# Patient Record
Sex: MALE | Race: AMERICAN INDIAN OR ALASKA NATIVE
[De-identification: names, ages, dates, MRNs, and addresses within clinical notes are randomized per-mention and may not be internally consistent; named-entity substitution may affect disease eponyms.]

---

## 2018-11-06 ENCOUNTER — HOSPITAL ENCOUNTER (INPATIENT)
Dept: HOSPITAL 31 - C.ER | Age: 73
LOS: 6 days | Discharge: HOME | DRG: 191 | End: 2018-11-12
Attending: INTERNAL MEDICINE | Admitting: INTERNAL MEDICINE
Payer: MEDICARE

## 2018-11-06 VITALS — BODY MASS INDEX: 26.4 KG/M2

## 2018-11-06 DIAGNOSIS — Z92.3: ICD-10-CM

## 2018-11-06 DIAGNOSIS — J44.1: Primary | ICD-10-CM

## 2018-11-06 DIAGNOSIS — I10: ICD-10-CM

## 2018-11-06 DIAGNOSIS — Z85.118: ICD-10-CM

## 2018-11-06 DIAGNOSIS — C79.31: ICD-10-CM

## 2018-11-06 DIAGNOSIS — Z87.891: ICD-10-CM

## 2018-11-06 DIAGNOSIS — N40.0: ICD-10-CM

## 2018-11-06 DIAGNOSIS — E11.9: ICD-10-CM

## 2018-11-06 DIAGNOSIS — E78.00: ICD-10-CM

## 2018-11-06 DIAGNOSIS — Z79.4: ICD-10-CM

## 2018-11-06 DIAGNOSIS — Z99.81: ICD-10-CM

## 2018-11-06 DIAGNOSIS — Z92.21: ICD-10-CM

## 2018-11-06 LAB
ALBUMIN SERPL-MCNC: 3.9 G/DL (ref 3.5–5)
ALBUMIN/GLOB SERPL: 1.4 {RATIO} (ref 1–2.1)
ALT SERPL-CCNC: 27 U/L (ref 21–72)
ANISOCYTOSIS BLD QL SMEAR: SLIGHT
AST SERPL-CCNC: 28 U/L (ref 17–59)
BASOPHILS # BLD AUTO: 0 K/UL (ref 0–0.2)
BASOPHILS NFR BLD: 0.3 % (ref 0–2)
BNP SERPL-MCNC: 183 PG/ML (ref 0–900)
BUN SERPL-MCNC: 19 MG/DL (ref 9–20)
BURR CELLS BLD QL SMEAR: SLIGHT
CALCIUM SERPL-MCNC: 9.4 MG/DL (ref 8.6–10.4)
CK MB SERPL-MCNC: 3.69 NG/ML (ref 0–3.38)
EOSINOPHIL # BLD AUTO: 0 K/UL (ref 0–0.7)
EOSINOPHIL NFR BLD: 0 % (ref 0–4)
ERYTHROCYTE [DISTWIDTH] IN BLOOD BY AUTOMATED COUNT: 16.9 % (ref 11.5–14.5)
GFR NON-AFRICAN AMERICAN: > 60
HGB BLD-MCNC: 12.3 G/DL (ref 12–18)
LYMPHOCYTE: 10 % (ref 20–40)
LYMPHOCYTES # BLD AUTO: 1.5 K/UL (ref 1–4.3)
LYMPHOCYTES NFR BLD AUTO: 9.5 % (ref 20–40)
MCH RBC QN AUTO: 28 PG (ref 27–31)
MCHC RBC AUTO-ENTMCNC: 33.3 G/DL (ref 33–37)
MCV RBC AUTO: 84.2 FL (ref 80–94)
MONOCYTE: 5 % (ref 0–10)
MONOCYTES # BLD: 0.7 K/UL (ref 0–0.8)
MONOCYTES NFR BLD: 4.4 % (ref 0–10)
NEUTROPHILS # BLD: 13.6 K/UL (ref 1.8–7)
NEUTROPHILS NFR BLD AUTO: 84 % (ref 50–75)
NEUTROPHILS NFR BLD AUTO: 85.8 % (ref 50–75)
NEUTS BAND NFR BLD: 1 % (ref 0–2)
NRBC BLD AUTO-RTO: 0 % (ref 0–2)
PLATELET # BLD EST: NORMAL 10*3/UL
PLATELET # BLD: 232 K/UL (ref 130–400)
PMV BLD AUTO: 8.7 FL (ref 7.2–11.7)
RBC # BLD AUTO: 4.39 MIL/UL (ref 4.4–5.9)
TOTAL CELLS COUNTED BLD: 100
WBC # BLD AUTO: 15.9 K/UL (ref 4.8–10.8)

## 2018-11-06 RX ADMIN — METHYLPREDNISOLONE SODIUM SUCCINATE SCH MG: 40 INJECTION, POWDER, FOR SOLUTION INTRAMUSCULAR; INTRAVENOUS at 18:25

## 2018-11-06 RX ADMIN — INSULIN ASPART SCH: 100 INJECTION, SOLUTION INTRAVENOUS; SUBCUTANEOUS at 21:16

## 2018-11-06 RX ADMIN — METHYLPREDNISOLONE SODIUM SUCCINATE SCH MG: 40 INJECTION, POWDER, FOR SOLUTION INTRAMUSCULAR; INTRAVENOUS at 23:58

## 2018-11-06 RX ADMIN — INSULIN GLARGINE SCH UNITS: 100 INJECTION, SOLUTION SUBCUTANEOUS at 22:01

## 2018-11-06 RX ADMIN — PROMETHAZINE HYDROCHLORIDE AND CODEINE PHOSPHATE PRN ML: 6.25; 1 SOLUTION ORAL at 18:25

## 2018-11-06 NOTE — RAD
Date of service: 



11/06/2018



PROCEDURE:  CHEST RADIOGRAPH, 1 VIEW



HISTORY:

SOB



COMPARISON:

3/24/2016



FINDINGS:



LUNGS:

Apical lordotic projection



Thin linear scarring-bibasilar right greater than left-appearance 

stable



No interval consolidation.  No mass appreciated



PLEURA:

No pneumothorax or pleural fluid seen.



CARDIOVASCULAR:

There is absence of aortic atherosclerotic calcification on x-ray..  

Normal heart size..  No pulmonary venous congestion



OSSEOUS STRUCTURES:

No fracture appreciated.  Bilateral shoulder arthrosis



VISUALIZED UPPER ABDOMEN:

Normal.



OTHER FINDINGS:

None. 



IMPRESSION:

No interval pathology noted.  No acute cardiopulmonary pathology 

noted

## 2018-11-06 NOTE — C.PDOC
History Of Present Illness





73-year-old male, PMHx includes Lung CA and COPD (on 3L O2 at home), is sent to 

the emergency department for evaluation of shortness of breath and wheezing for 

the past week, associated with productive cough. Patient was given antibiotics 

and Prednisone by Dr Anders with no improvement. He denies fever or chest pain. 

No other complaints at this time.


Time Seen by Provider: 11/06/18 09:44


Chief Complaint (Nursing): Shortness Of Breath


History Per: Patient


History/Exam Limitations: no limitations


Current Symptoms Are (Timing): Still Present





Past Medical History


Reviewed: Historical Data, Nursing Documentation, Vital Signs


Vital Signs: 





                                Last Vital Signs











Temp  97.7 F   11/06/18 09:56


 


Pulse  104 H  11/06/18 09:56


 


Resp  16   11/06/18 10:41


 


BP  100/67   11/06/18 09:56


 


Pulse Ox  97   11/06/18 10:15














- Medical History


PMH: Asthma, COPD, Diabetes, Emphysema, HTN, Hypercholesterolemia, Malignancy 

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


   Denies: Chronic Kidney Disease





- [facility] Procedures











EXCISION OF DESCENDING COLON, ENDO, DIAGN (03/24/16)


EXCISION OF STOMACH, ENDO, DIAGN (03/24/16)


INSERTION OF INFUSION DEV INTO R BASILIC VEIN, PERC APPROACH (02/19/16)


ULTRASONOGRAPHY OF RIGHT UPPER EXTREMITY VEINS, GUIDANCE (02/19/16)








Family History: States: Diabetes





- Social History


Hx Tobacco Use: No


Hx Alcohol Use: No


Hx Substance Use: No





- Immunization History


Hx Tetanus Toxoid Vaccination: No


Hx Influenza Vaccination: Yes


Hx Pneumococcal Vaccination: Yes





Review Of Systems


Constitutional: Negative for: Fever


Cardiovascular: Negative for: Chest Pain, Edema


Respiratory: Positive for: Cough, Sputum, Wheezing


Gastrointestinal: Negative for: Nausea, Vomiting





Physical Exam





- Physical Exam


Appears: Non-toxic, No Acute Distress


Skin: Warm, Dry, No Rash


Head: Atraumatic


Eye(s): bilateral: Normal Inspection


Nose: Normal


Oral Mucosa: Moist


Lips: Normal Appearing


Neck: Normal ROM


Chest: Symmetrical


Cardiovascular: Rhythm Regular, No Murmur


Respiratory: No Decreased Breath Sounds, No Accessory Muscle Use, No Rales, 

Wheezing (B/L expiratory)


Gastrointestinal/Abdominal: Soft, No Tenderness


Extremity: Normal ROM, No Pedal Edema


Neurological/Psych: Oriented x3, Normal Speech





ED Course And Treatment





- Laboratory Results


Result Diagrams: 


                                 11/06/18 10:28





                                 11/06/18 10:28


O2 Sat by Pulse Oximetry: 97


Pulse Ox Interpretation: Normal (RA)





- Other Rad


  ** CXR 


X-Ray: Viewed By Me, Read By Radiologist


Interpretation: Accession No. : [id number].  Patient Name / ID : FREEMAN PENN  / [id number].  Exam Date : 11/06/2018 10:37:04 ( Approved ).  Study 

Comment :  Sex / Age : M  / 73Y.  Creator : Dr. Caldwell-Lombardi, Kathleen V. 

Dictator : Dr. Caldwell-Lombardi, Kathleen V.   :  Approver : Dr. Caldwell-Lombardi, Kathleen V.  Approver2 :  Report Date : 11/06/2018 10:58:57. 

My Comment :  *******************

****************************************************************.  Date of 

service:  11/06/2018.  PROCEDURE:  CHEST RADIOGRAPH, 1 VIEW.  HISTORY:  SOB.  

COMPARISON:  3/24/2016.  FINDINGS:  LUNGS:  Apical lordotic projection.  Thin 

linear scarring-bibasilar right greater than left-appearance stable.  No 

interval consolidation.  No mass appreciated.  PLEURA:  No pneumothorax or 

pleural fluid seen.  CARDIOVASCULAR:  There is absence of aortic atherosclerotic

calcification on x-ray..  Normal heart size..  No pulmonary venous congestion.  

OSSEOUS STRUCTURES:  No fracture appreciated.  Bilateral shoulder arthrosis.  

VISUALIZED UPPER ABDOMEN:  Normal.  OTHER FINDINGS:  None.  IMPRESSION:  No 

interval pathology noted.  No acute cardiopulmonary pathology noted


Progress Note: EKG, Bloodwork, Chest X-Ray ordered and reviewed. Patient treated

with duoneb and Solumedrol.





Disposition





- Disposition


Forms:  [facility] (English)





- Scribe Statement


The provider has reviewed the documentation as recorded by the Scribe (Lebron Saunders)


Provider Attestation: 








All medical record entries made by the Scribe were at my direction and 

personally dictated by me. I have reviewed the chart and agree that the record 

accurately reflects my personal performance of the history, physical exam, 

medical decision making, and the department course for this patient. I have also

personally directed, reviewed, and agree with the discharge instructions and d

isposition.

## 2018-11-07 LAB
ALBUMIN SERPL-MCNC: 3.5 G/DL (ref 3.5–5)
ALBUMIN/GLOB SERPL: 1.4 {RATIO} (ref 1–2.1)
ALT SERPL-CCNC: 28 U/L (ref 21–72)
ANISOCYTOSIS BLD QL SMEAR: SLIGHT
AST SERPL-CCNC: 16 U/L (ref 17–59)
BASOPHILS # BLD AUTO: 0 K/UL (ref 0–0.2)
BASOPHILS NFR BLD: 0.1 % (ref 0–2)
BUN SERPL-MCNC: 20 MG/DL (ref 9–20)
CALCIUM SERPL-MCNC: 8.5 MG/DL (ref 8.6–10.4)
EOSINOPHIL # BLD AUTO: 0 K/UL (ref 0–0.7)
EOSINOPHIL NFR BLD: 0.1 % (ref 0–4)
ERYTHROCYTE [DISTWIDTH] IN BLOOD BY AUTOMATED COUNT: 16.9 % (ref 11.5–14.5)
GFR NON-AFRICAN AMERICAN: > 60
HGB BLD-MCNC: 11.1 G/DL (ref 12–18)
LYMPHOCYTE: 6 % (ref 20–40)
LYMPHOCYTES # BLD AUTO: 0.7 K/UL (ref 1–4.3)
LYMPHOCYTES NFR BLD AUTO: 4.5 % (ref 20–40)
MCH RBC QN AUTO: 28 PG (ref 27–31)
MCHC RBC AUTO-ENTMCNC: 33.3 G/DL (ref 33–37)
MCV RBC AUTO: 84.1 FL (ref 80–94)
MONOCYTE: 6 % (ref 0–10)
MONOCYTES # BLD: 0.5 K/UL (ref 0–0.8)
MONOCYTES NFR BLD: 3.3 % (ref 0–10)
NEUTROPHILS # BLD: 14.8 K/UL (ref 1.8–7)
NEUTROPHILS NFR BLD AUTO: 86 % (ref 50–75)
NEUTROPHILS NFR BLD AUTO: 92 % (ref 50–75)
NEUTS BAND NFR BLD: 2 % (ref 0–2)
NRBC BLD AUTO-RTO: 0 % (ref 0–2)
OVALOCYTES BLD QL SMEAR: SLIGHT
PLATELET # BLD EST: NORMAL 10*3/UL
PLATELET # BLD: 197 K/UL (ref 130–400)
PMV BLD AUTO: 8.2 FL (ref 7.2–11.7)
RBC # BLD AUTO: 3.94 MIL/UL (ref 4.4–5.9)
TOTAL CELLS COUNTED BLD: 100
WBC # BLD AUTO: 16.1 K/UL (ref 4.8–10.8)

## 2018-11-07 RX ADMIN — IPRATROPIUM BROMIDE AND ALBUTEROL SULFATE SCH ML: .5; 3 SOLUTION RESPIRATORY (INHALATION) at 08:24

## 2018-11-07 RX ADMIN — PROMETHAZINE HYDROCHLORIDE AND CODEINE PHOSPHATE PRN ML: 6.25; 1 SOLUTION ORAL at 21:40

## 2018-11-07 RX ADMIN — INSULIN GLARGINE SCH UNITS: 100 INJECTION, SOLUTION SUBCUTANEOUS at 21:40

## 2018-11-07 RX ADMIN — INSULIN ASPART SCH: 100 INJECTION, SOLUTION INTRAVENOUS; SUBCUTANEOUS at 21:25

## 2018-11-07 RX ADMIN — IPRATROPIUM BROMIDE AND ALBUTEROL SULFATE SCH ML: .5; 3 SOLUTION RESPIRATORY (INHALATION) at 19:57

## 2018-11-07 RX ADMIN — INSULIN ASPART SCH UNITS: 100 INJECTION, SOLUTION INTRAVENOUS; SUBCUTANEOUS at 10:38

## 2018-11-07 RX ADMIN — IPRATROPIUM BROMIDE AND ALBUTEROL SULFATE SCH ML: .5; 3 SOLUTION RESPIRATORY (INHALATION) at 12:50

## 2018-11-07 RX ADMIN — METHYLPREDNISOLONE SODIUM SUCCINATE SCH MG: 40 INJECTION, POWDER, FOR SOLUTION INTRAMUSCULAR; INTRAVENOUS at 17:33

## 2018-11-07 RX ADMIN — PROMETHAZINE HYDROCHLORIDE AND CODEINE PHOSPHATE PRN ML: 6.25; 1 SOLUTION ORAL at 10:46

## 2018-11-07 RX ADMIN — METHYLPREDNISOLONE SODIUM SUCCINATE SCH MG: 40 INJECTION, POWDER, FOR SOLUTION INTRAMUSCULAR; INTRAVENOUS at 12:33

## 2018-11-07 RX ADMIN — IPRATROPIUM BROMIDE AND ALBUTEROL SULFATE SCH ML: .5; 3 SOLUTION RESPIRATORY (INHALATION) at 03:29

## 2018-11-07 RX ADMIN — PANTOPRAZOLE SODIUM SCH MG: 40 TABLET, DELAYED RELEASE ORAL at 10:42

## 2018-11-07 RX ADMIN — INSULIN ASPART SCH UNITS: 100 INJECTION, SOLUTION INTRAVENOUS; SUBCUTANEOUS at 17:51

## 2018-11-07 RX ADMIN — METHYLPREDNISOLONE SODIUM SUCCINATE SCH MG: 40 INJECTION, POWDER, FOR SOLUTION INTRAMUSCULAR; INTRAVENOUS at 05:14

## 2018-11-07 RX ADMIN — IPRATROPIUM BROMIDE AND ALBUTEROL SULFATE SCH ML: .5; 3 SOLUTION RESPIRATORY (INHALATION) at 15:46

## 2018-11-07 RX ADMIN — IPRATROPIUM BROMIDE AND ALBUTEROL SULFATE SCH ML: .5; 3 SOLUTION RESPIRATORY (INHALATION) at 00:03

## 2018-11-07 RX ADMIN — ENOXAPARIN SODIUM SCH MG: 40 INJECTION SUBCUTANEOUS at 10:40

## 2018-11-07 RX ADMIN — INSULIN ASPART SCH UNITS: 100 INJECTION, SOLUTION INTRAVENOUS; SUBCUTANEOUS at 12:46

## 2018-11-07 NOTE — CARD
--------------- APPROVED REPORT --------------





Date of service: 11/06/2018



EKG Measurement

Heart [id number]

RI [id number]

[id number]

[id number]

[id number]



<Conclusion>

Sinus tachycardia

Otherwise normal ECG

## 2018-11-07 NOTE — CP.PCM.PN
Subjective





- Date & Time of Evaluation


Date of Evaluation: 11/07/18


Time of Evaluation: 14:25





- Subjective


Subjective: 

















This is a 72 yo male with past medical hx of small cell lung cancer, s/p chemo 

and radiation, COPD, on home oxygen, HTN, DM, BPH, presenting to [facility] [place] ER after

being sent in from Dr. Anders's office. Pt has been having an increase in 

shortness of breath and wheezing for some days now. Pt was going in and being 

treated by Dr. Anders. Tx was not helping so pt went back into office, but Dr. Anders recommended he be admitted for hospitalization. Most recent echo shows EF

of 60-65 percent with normal EF. 














PMH: COPD, small cell lung cancer, s/p chemo and radiation (seen by Dr. Brandon)











PSH: 











Allergies: NKDA











FH: Non contributory











Social hx: former smoker. denies drinking. denies drug use. Born in . 





Objective





- Vital Signs/Intake and Output


Vital Signs (last 24 hours): 


                                        











Temp Pulse Resp BP Pulse Ox


 


 97.9 F   86   20   136/77   99 


 


 11/07/18 08:00  11/07/18 08:00  11/07/18 08:00  11/07/18 10:42  11/07/18 08:00








Intake and Output: 


                                        











 11/07/18 11/07/18





 06:59 18:59


 


Intake Total 740 


 


Balance 740 














- Medications


Medications: 


                               Current Medications





Albuterol/Ipratropium (Duoneb 3 Mg/0.5 Mg (3 Ml) Ud)  3 ml INH RQ4 [facility]


   Last Admin: 11/07/18 12:50 Dose:  3 ml


Aspirin (Aspirin Chewable)  81 mg PO DAILY [facility]


   Last Admin: 11/07/18 10:43 Dose:  81 mg


Enalapril Maleate (Vasotec)  10 mg PO BID [facility]


   Last Admin: 11/07/18 10:42 Dose:  10 mg


Enoxaparin Sodium (Lovenox)  40 mg SC DAILY [facility]


   Last Admin: 11/07/18 10:40 Dose:  40 mg


Home Med (Patient's Own Drops)  1 drop OS QID [facility]


   Last Admin: 11/07/18 13:26 Dose:  1 drop


Home Med (Patient's Own Drops)  1 drop OD QID [facility]


   Last Admin: 11/07/18 13:30 Dose:  1 drop


Home Med (Patient's Own Drops)  1 drop OS [facility]


   Last Admin: 11/06/18 21:21 Dose:  Not Given


Insulin Aspart (Novolog)  0 unit SC [facility]; Protocol


   Last Admin: 11/07/18 12:46 Dose:  4 units


Insulin Glargine (Lantus)  10 unit SC [facility]


   Last Admin: 11/06/18 22:01 Dose:  10 units


Metformin HCl (Glucophage)  500 mg PO BID [facility]


   Last Admin: 11/07/18 10:43 Dose:  500 mg


Methylprednisolone (Solu-Medrol)  40 mg IVP Q6H [facility]


   Last Admin: 11/07/18 12:33 Dose:  40 mg


Moxifloxacin HCl (Avelox)  400 mg PO DAILY [facility]; Protocol


   Last Admin: 11/07/18 10:41 Dose:  400 mg


Pantoprazole Sodium (Protonix Ec Tab)  40 mg PO DAILY [facility]


   Last Admin: 11/07/18 10:42 Dose:  40 mg


Promethazine HCl/Codeine (Phenergan/Codeine Oral Syrup)  5 ml PO Q6H PRN


   PRN Reason: Cough


   Last Admin: 11/07/18 10:46 Dose:  5 ml


Rosuvastatin Calcium (Crestor)  5 mg PO [facility]


   Last Admin: 11/06/18 21:20 Dose:  5 mg


Tamsulosin HCl (Flomax)  0.4 mg PO DAILY [facility]


   Last Admin: 11/07/18 10:45 Dose:  0.4 mg











- Labs


Labs: 


                                        





                                 11/07/18 10:57 





                                 11/07/18 10:57 











- Constitutional


Appears: Non-toxic, No Acute Distress





- Head Exam


Head Exam: ATRAUMATIC, NORMAL INSPECTION, NORMOCEPHALIC





- Eye Exam


Eye Exam: EOMI





- ENT Exam


ENT Exam: Mucous Membranes Moist





- Respiratory Exam


Respiratory Exam: Wheezes.  absent: Respiratory Distress





- Cardiovascular Exam


Cardiovascular Exam: +S1, +S2





- GI/Abdominal Exam


GI & Abdominal Exam: Soft, Normal Bowel Sounds.  absent: Tenderness





- Extremities Exam


Extremities Exam: Full ROM, Normal Inspection





- Back Exam


Back Exam: NORMAL INSPECTION





- Neurological Exam


Neurological Exam: Alert, Awake, Oriented x3





- Psychiatric Exam


Psychiatric exam: Normal Affect, Normal Mood





- Skin


Skin Exam: Dry, Intact, Normal Color, Warm





Assessment and Plan





- Assessment and Plan (Free Text)


Assessment: 




















This is a 72 yo male with














1. COPD exacerbation








-trop negative x 1


-duonebs RQ4 


-solumedrol 40 IV q 6


-oxygen therapy 


-avelox 400 PO daily














2. Hx of HTN











-continue enalapril 10 mg PO BID














3. hx of lung cancer








-small cell


-no hx of sx


-s/p chemo and radiation


-tx with  16 and cisplatin

















4. hx of DM





-a1C


-lipid panel


-ISS


-lantus 10 


-metformin 500 bid




















5. hx of BPH











-flomax .4 daily

















6. GI/DVT











-lovenox 40 


-protonix 40 daily























discussed with Dr. Anders.

## 2018-11-08 RX ADMIN — PROMETHAZINE HYDROCHLORIDE AND CODEINE PHOSPHATE PRN ML: 6.25; 1 SOLUTION ORAL at 09:29

## 2018-11-08 RX ADMIN — METHYLPREDNISOLONE SODIUM SUCCINATE SCH MG: 40 INJECTION, POWDER, FOR SOLUTION INTRAMUSCULAR; INTRAVENOUS at 12:44

## 2018-11-08 RX ADMIN — IPRATROPIUM BROMIDE AND ALBUTEROL SULFATE SCH: .5; 3 SOLUTION RESPIRATORY (INHALATION) at 04:50

## 2018-11-08 RX ADMIN — INSULIN GLARGINE SCH UNITS: 100 INJECTION, SOLUTION SUBCUTANEOUS at 22:29

## 2018-11-08 RX ADMIN — IPRATROPIUM BROMIDE AND ALBUTEROL SULFATE SCH ML: .5; 3 SOLUTION RESPIRATORY (INHALATION) at 00:55

## 2018-11-08 RX ADMIN — INSULIN ASPART SCH UNITS: 100 INJECTION, SOLUTION INTRAVENOUS; SUBCUTANEOUS at 12:30

## 2018-11-08 RX ADMIN — PANTOPRAZOLE SODIUM SCH MG: 40 TABLET, DELAYED RELEASE ORAL at 09:11

## 2018-11-08 RX ADMIN — ACETYLCYSTEINE SCH: 200 INHALANT RESPIRATORY (INHALATION) at 11:15

## 2018-11-08 RX ADMIN — IPRATROPIUM BROMIDE AND ALBUTEROL SULFATE SCH ML: .5; 3 SOLUTION RESPIRATORY (INHALATION) at 15:45

## 2018-11-08 RX ADMIN — ACETYLCYSTEINE SCH ML: 200 INHALANT RESPIRATORY (INHALATION) at 15:45

## 2018-11-08 RX ADMIN — ACETYLCYSTEINE SCH ML: 200 INHALANT RESPIRATORY (INHALATION) at 20:27

## 2018-11-08 RX ADMIN — IPRATROPIUM BROMIDE AND ALBUTEROL SULFATE SCH ML: .5; 3 SOLUTION RESPIRATORY (INHALATION) at 08:15

## 2018-11-08 RX ADMIN — INSULIN ASPART SCH UNITS: 100 INJECTION, SOLUTION INTRAVENOUS; SUBCUTANEOUS at 08:30

## 2018-11-08 RX ADMIN — INSULIN ASPART SCH: 100 INJECTION, SOLUTION INTRAVENOUS; SUBCUTANEOUS at 21:03

## 2018-11-08 RX ADMIN — ACETYLCYSTEINE SCH ML: 200 INHALANT RESPIRATORY (INHALATION) at 12:30

## 2018-11-08 RX ADMIN — METHYLPREDNISOLONE SODIUM SUCCINATE SCH MG: 40 INJECTION, POWDER, FOR SOLUTION INTRAMUSCULAR; INTRAVENOUS at 05:23

## 2018-11-08 RX ADMIN — ENOXAPARIN SODIUM SCH MG: 40 INJECTION SUBCUTANEOUS at 09:14

## 2018-11-08 RX ADMIN — METHYLPREDNISOLONE SODIUM SUCCINATE SCH MG: 40 INJECTION, POWDER, FOR SOLUTION INTRAMUSCULAR; INTRAVENOUS at 22:29

## 2018-11-08 RX ADMIN — METHYLPREDNISOLONE SODIUM SUCCINATE SCH MG: 40 INJECTION, POWDER, FOR SOLUTION INTRAMUSCULAR; INTRAVENOUS at 00:19

## 2018-11-08 RX ADMIN — METHYLPREDNISOLONE SODIUM SUCCINATE SCH MG: 40 INJECTION, POWDER, FOR SOLUTION INTRAMUSCULAR; INTRAVENOUS at 15:35

## 2018-11-08 RX ADMIN — INSULIN ASPART SCH UNITS: 100 INJECTION, SOLUTION INTRAVENOUS; SUBCUTANEOUS at 17:53

## 2018-11-08 RX ADMIN — IPRATROPIUM BROMIDE AND ALBUTEROL SULFATE SCH ML: .5; 3 SOLUTION RESPIRATORY (INHALATION) at 20:27

## 2018-11-08 RX ADMIN — IPRATROPIUM BROMIDE AND ALBUTEROL SULFATE SCH ML: .5; 3 SOLUTION RESPIRATORY (INHALATION) at 12:30

## 2018-11-08 NOTE — CP.PCM.PN
Subjective





- Date & Time of Evaluation


Date of Evaluation: 11/08/18


Time of Evaluation: 07:09





- Subjective


Subjective: 


PGY2- Progress note for Dr. Anders





Patient was seen and examined at bedside in no acute distress. Patient reports 

feeling better than yesterday. Patient denies cough, shortness or breath, chest 

pain, palpitations, nausea, vomiting, fevers, headaches, abdominal pain, 

constipation, diarrhea, dysuria. No acute events overnight.





Objective





- Vital Signs/Intake and Output


Vital Signs (last 24 hours): 


                                        











Temp Pulse Resp BP Pulse Ox


 


 98 F   100 H  20   121/81   98 


 


 11/07/18 23:35  11/08/18 00:15  11/07/18 23:35  11/07/18 23:35  11/07/18 23:35








Intake and Output: 


                                        











 11/08/18 11/08/18





 06:59 18:59


 


Intake Total 1000 


 


Balance 1000 














- Medications


Medications: 


                               Current Medications





Albuterol/Ipratropium (Duoneb 3 Mg/0.5 Mg (3 Ml) Ud)  3 ml INH RQ4 [facility]


   Last Admin: 11/08/18 04:50 Dose:  Not Given


Aspirin (Aspirin Chewable)  81 mg PO DAILY [facility]


   Last Admin: 11/07/18 10:43 Dose:  81 mg


Enalapril Maleate (Vasotec)  10 mg PO BID [facility]


   Last Admin: 11/07/18 17:52 Dose:  10 mg


Enoxaparin Sodium (Lovenox)  40 mg SC DAILY [facility]


   Last Admin: 11/07/18 10:40 Dose:  40 mg


Home Med (Patient's Own Drops)  1 drop OS QID [facility]


   Last Admin: 11/07/18 21:42 Dose:  1 drop


Home Med (Patient's Own Drops)  1 drop OD QID [facility]


   Last Admin: 11/07/18 21:42 Dose:  1 drop


Home Med (Patient's Own Drops)  1 drop OS [facility]


   Last Admin: 11/07/18 21:41 Dose:  1 drop


Insulin Aspart (Novolog)  0 unit SC [facility]; Protocol


   Last Admin: 11/07/18 21:25 Dose:  Not Given


Insulin Glargine (Lantus)  10 unit SC [facility]


   Last Admin: 11/07/18 21:40 Dose:  10 units


Metformin HCl (Glucophage)  500 mg PO BID [facility]


   Last Admin: 11/07/18 17:33 Dose:  500 mg


Methylprednisolone (Solu-Medrol)  40 mg IVP Q6H [facility]


   Last Admin: 11/08/18 05:23 Dose:  40 mg


Moxifloxacin HCl (Avelox)  400 mg PO DAILY [facility]; Protocol


   Last Admin: 11/07/18 10:41 Dose:  400 mg


Pantoprazole Sodium (Protonix Ec Tab)  40 mg PO DAILY [facility]


   Last Admin: 11/07/18 10:42 Dose:  40 mg


Promethazine HCl/Codeine (Phenergan/Codeine Oral Syrup)  5 ml PO Q6H PRN


   PRN Reason: Cough


   Last Admin: 11/07/18 21:40 Dose:  5 ml


Rosuvastatin Calcium (Crestor)  5 mg PO HS [facility]


   Last Admin: 11/07/18 21:40 Dose:  5 mg


Tamsulosin HCl (Flomax)  0.4 mg PO DAILY [facility]


   Last Admin: 11/07/18 10:45 Dose:  0.4 mg











- Labs


Labs: 


                                        





                                 11/07/18 10:57 





                                 11/07/18 10:57 











- Constitutional


Appears: No Acute Distress





- Head Exam


Head Exam: NORMAL INSPECTION





- Eye Exam


Eye Exam: EOMI, Normal appearance





- ENT Exam


ENT Exam: Mucous Membranes Moist





- Respiratory Exam


Respiratory Exam: Wheezes, NORMAL BREATHING PATTERN.  absent: Rales, Rhonchi, 

Respiratory Distress





- Cardiovascular Exam


Cardiovascular Exam: REGULAR RHYTHM, +S1, +S2





- GI/Abdominal Exam


GI & Abdominal Exam: Soft, Normal Bowel Sounds.  absent: Distended, Firm, 

Tenderness





- Extremities Exam


Extremities Exam: Normal Inspection.  absent: Calf Tenderness, Pedal Edema, 

Tenderness





- Neurological Exam


Neurological Exam: Alert, Awake, Oriented x3





- Psychiatric Exam


Psychiatric exam: Normal Affect, Normal Mood





- Skin


Skin Exam: Dry, Normal Color, Warm





Assessment and Plan





- Assessment and Plan (Free Text)


Plan: 


COPD exacerbation


- Duonebs RQ4, mucomyst Q4h


- Solumedrol 40 IV q8h


- O2 via nc prn 


- Avelox 400 PO daily (active on 11/6/18)





DM


- Accuchecks


- Hypoglycemia protocol


- a1C: f/u


- ISS


- Lantus 10units SC HS


- Metformin 500mg PO BID





HTN


- Continue Enalapril 10 mg PO BID





HLD


- Continue Crestor 5mg PO HS





Hx of small cell lung cancer


- s/p chemo and radiation; tx with  16 and cisplatin





BPH


- Flomax 0.4mg PO daily





Prophylaxis


- DVT: Lovenox 40mg SC, SCDs


- Protonix 40mg PO daily


- Consistent carb diet





All management/orders per Dr. Anders.

## 2018-11-09 RX ADMIN — INSULIN GLARGINE SCH UNITS: 100 INJECTION, SOLUTION SUBCUTANEOUS at 22:30

## 2018-11-09 RX ADMIN — IPRATROPIUM BROMIDE AND ALBUTEROL SULFATE SCH ML: .5; 3 SOLUTION RESPIRATORY (INHALATION) at 08:15

## 2018-11-09 RX ADMIN — PROMETHAZINE HYDROCHLORIDE AND CODEINE PHOSPHATE PRN ML: 6.25; 1 SOLUTION ORAL at 19:02

## 2018-11-09 RX ADMIN — ACETYLCYSTEINE SCH: 200 INHALANT RESPIRATORY (INHALATION) at 19:31

## 2018-11-09 RX ADMIN — METHYLPREDNISOLONE SODIUM SUCCINATE SCH MG: 40 INJECTION, POWDER, FOR SOLUTION INTRAMUSCULAR; INTRAVENOUS at 06:35

## 2018-11-09 RX ADMIN — PANTOPRAZOLE SODIUM SCH MG: 40 TABLET, DELAYED RELEASE ORAL at 10:32

## 2018-11-09 RX ADMIN — ACETYLCYSTEINE SCH: 200 INHALANT RESPIRATORY (INHALATION) at 13:58

## 2018-11-09 RX ADMIN — ACETYLCYSTEINE SCH: 200 INHALANT RESPIRATORY (INHALATION) at 16:15

## 2018-11-09 RX ADMIN — IPRATROPIUM BROMIDE AND ALBUTEROL SULFATE SCH ML: .5; 3 SOLUTION RESPIRATORY (INHALATION) at 00:19

## 2018-11-09 RX ADMIN — ACETYLCYSTEINE SCH ML: 200 INHALANT RESPIRATORY (INHALATION) at 08:16

## 2018-11-09 RX ADMIN — IPRATROPIUM BROMIDE AND ALBUTEROL SULFATE SCH ML: .5; 3 SOLUTION RESPIRATORY (INHALATION) at 03:16

## 2018-11-09 RX ADMIN — IPRATROPIUM BROMIDE AND ALBUTEROL SULFATE SCH ML: .5; 3 SOLUTION RESPIRATORY (INHALATION) at 13:59

## 2018-11-09 RX ADMIN — METHYLPREDNISOLONE SODIUM SUCCINATE SCH MG: 40 INJECTION, POWDER, FOR SOLUTION INTRAMUSCULAR; INTRAVENOUS at 22:29

## 2018-11-09 RX ADMIN — INSULIN ASPART SCH UNITS: 100 INJECTION, SOLUTION INTRAVENOUS; SUBCUTANEOUS at 12:48

## 2018-11-09 RX ADMIN — ACETYLCYSTEINE SCH: 200 INHALANT RESPIRATORY (INHALATION) at 00:19

## 2018-11-09 RX ADMIN — IPRATROPIUM BROMIDE AND ALBUTEROL SULFATE SCH ML: .5; 3 SOLUTION RESPIRATORY (INHALATION) at 19:32

## 2018-11-09 RX ADMIN — INSULIN ASPART SCH UNITS: 100 INJECTION, SOLUTION INTRAVENOUS; SUBCUTANEOUS at 17:25

## 2018-11-09 RX ADMIN — METHYLPREDNISOLONE SODIUM SUCCINATE SCH MG: 40 INJECTION, POWDER, FOR SOLUTION INTRAMUSCULAR; INTRAVENOUS at 14:38

## 2018-11-09 RX ADMIN — IPRATROPIUM BROMIDE AND ALBUTEROL SULFATE SCH ML: .5; 3 SOLUTION RESPIRATORY (INHALATION) at 16:15

## 2018-11-09 RX ADMIN — ENOXAPARIN SODIUM SCH MG: 40 INJECTION SUBCUTANEOUS at 10:30

## 2018-11-09 RX ADMIN — INSULIN ASPART SCH UNITS: 100 INJECTION, SOLUTION INTRAVENOUS; SUBCUTANEOUS at 10:30

## 2018-11-09 RX ADMIN — INSULIN ASPART SCH: 100 INJECTION, SOLUTION INTRAVENOUS; SUBCUTANEOUS at 21:47

## 2018-11-09 RX ADMIN — ACETYLCYSTEINE SCH: 200 INHALANT RESPIRATORY (INHALATION) at 03:16

## 2018-11-09 NOTE — CP.PCM.PN
Subjective





- Date & Time of Evaluation


Date of Evaluation: 11/09/18


Time of Evaluation: 07:29





- Subjective


Subjective: 


PGY2- Progress note for Dr. Anders





Patient was seen and examined at bedside in no acute distress. Patient admits to

wheezing and still feeling short of breath. He denies having cough, fevers, 

headaches, nausea, vomiting, abdominal pain, chest pain, palpitations.





Objective





- Vital Signs/Intake and Output


Vital Signs (last 24 hours): 


                                        











Temp Pulse Resp BP Pulse Ox


 


 98.2 F   101 H  20   110/64   98 


 


 11/08/18 23:33  11/09/18 00:05  11/08/18 23:33  11/08/18 23:33  11/08/18 23:33








Intake and Output: 


                                        











 11/09/18 11/09/18





 06:59 18:59


 


Intake Total 240 


 


Balance 240 














- Medications


Medications: 


                               Current Medications





Acetylcysteine (Acetylcysteine 20%)  4 ml INH RQ4 [facility]


   Last Admin: 11/09/18 03:16 Dose:  Not Given


Albuterol/Ipratropium (Duoneb 3 Mg/0.5 Mg (3 Ml) Ud)  3 ml INH RQ4 [facility]


   Last Admin: 11/09/18 03:16 Dose:  3 ml


Aspirin (Aspirin Chewable)  81 mg PO DAILY [facility]


   Last Admin: 11/08/18 09:11 Dose:  81 mg


Enalapril Maleate (Vasotec)  10 mg PO BID [facility]


   Last Admin: 11/08/18 17:00 Dose:  Not Given


Enoxaparin Sodium (Lovenox)  40 mg SC DAILY [facility]


   Last Admin: 11/08/18 09:14 Dose:  40 mg


Home Med (Patient's Own Drops)  1 drop OS QID [facility]


   Last Admin: 11/08/18 22:29 Dose:  1 drop


Home Med (Patient's Own Drops)  1 drop OD QID [facility]


   Last Admin: 11/08/18 22:29 Dose:  1 drop


Home Med (Patient's Own Drops)  1 drop OS HS [facility]


   Last Admin: 11/08/18 22:29 Dose:  1 drop


Insulin Aspart (Novolog)  0 unit SC [facility]; Protocol


   Last Admin: 11/08/18 21:03 Dose:  Not Given


Insulin Glargine (Lantus)  10 unit SC [facility]


   Last Admin: 11/08/18 22:29 Dose:  10 units


Metformin HCl (Glucophage)  500 mg PO BID [facility]


   Last Admin: 11/08/18 17:53 Dose:  500 mg


Methylprednisolone (Solu-Medrol)  40 mg IVP Q8H [facility]


   Last Admin: 11/09/18 06:35 Dose:  40 mg


Moxifloxacin HCl (Avelox)  400 mg PO DAILY [facility]; Protocol


   Last Admin: 11/08/18 09:12 Dose:  400 mg


Pantoprazole Sodium (Protonix Ec Tab)  40 mg PO DAILY [facility]


   Last Admin: 11/08/18 09:11 Dose:  40 mg


Promethazine HCl/Codeine (Phenergan/Codeine Oral Syrup)  5 ml PO Q6H PRN


   PRN Reason: Cough


   Last Admin: 11/08/18 09:29 Dose:  5 ml


Rosuvastatin Calcium (Crestor)  5 mg PO HS [facility]


   Last Admin: 11/08/18 22:29 Dose:  5 mg


Tamsulosin HCl (Flomax)  0.4 mg PO DAILY [facility]


   Last Admin: 11/08/18 09:12 Dose:  0.4 mg











- Labs


Labs: 


                                        





                                 11/07/18 10:57 





                                 11/07/18 10:57 











- Additional Findings


Additional findings: 


- Constitutional


Appears: No Acute Distress





- Head Exam


Head Exam: NORMAL INSPECTION





- Eye Exam


Eye Exam: EOMI, Normal appearance





- ENT Exam


ENT Exam: Mucous Membranes Moist





- Respiratory Exam


Respiratory Exam: Wheezes B/L, NORMAL BREATHING PATTERN.  absent: Rales, 

Rhonchi, Respiratory Distress





- Cardiovascular Exam


Cardiovascular Exam: REGULAR RHYTHM, +S1, +S2





- GI/Abdominal Exam


GI & Abdominal Exam: Soft, Normal Bowel Sounds.  absent: Distended, Firm, 

Tenderness





- Extremities Exam


Extremities Exam: Normal Inspection.  absent: Calf Tenderness, Pedal Edema, 

Tenderness





- Neurological Exam


Neurological Exam: Alert, Awake, Oriented x3





- Psychiatric Exam


Psychiatric exam: Normal Affect, Normal Mood





- Skin


Skin Exam: Dry, Normal Color, Warm





Assessment and Plan





- Assessment and Plan (Free Text)


Plan: 


COPD exacerbation


- Duonebs RQ4, mucomyst Q4h


- Solumedrol 40 IV q8h


- O2 via nc prn 


- Avelox 400 PO daily (active on 11/6/18)


- CXR: negative for acute pathology


- Chest CT: chronic changes consistent with COPD





DM


- Accuchecks


- Hypoglycemia protocol


- ISS


- Lantus 10units SC HS


- Metformin 500mg PO BID





HTN


- Continue Enalapril 10 mg PO BID





HLD


- Continue Crestor 5mg PO HS





Hx of small cell lung cancer


- s/p chemo and radiation; tx with  16 and cisplatin





BPH


- Flomax 0.4mg PO daily





Prophylaxis


- DVT: Lovenox 40mg SC, SCDs


- Protonix 40mg PO daily


- Consistent carb diet





All management/orders per Dr. Anders.

## 2018-11-09 NOTE — CT
Date of service: 



11/09/2018



PROCEDURE:  CT Chest without contrast



HISTORY:

Shortness of breath, wheezing



COMPARISON:

None available.



TECHNIQUE:

Contiguous axial images were obtained through the chest without 

intravenous contrast enhancement. Sagittal and coronal 

reconstructions were performed.







Radiation dose:



Total exam DLP = 656.74 mGy-cm.



This CT exam was performed using one or more of the following dose 

reduction techniques: Automated exposure control, adjustment of the 

mA and/or kV according to patient size, and/or use of iterative 

reconstruction technique.



FINDINGS:



LUNGS:

Chronic manifestations of COPD felt be present.. Mild 

curvilinear/linear type atelectasis/scarring changes seen in the 

right lower lobe as well as right middle lobe bordering the minor 

fissure. 



MEDIASTINUM:

Unremarkable thoracic aorta. No aneurysm.. At ascending thoracic 

aorta measures approximately 3.6 cm and descending thoracic aorta 

measures approximately 2.8 cm. Normal sized heart. Main pulmonary 

artery unremarkable measuring approximately 2.8 cm.. No vascular 

congestion. No significant mediastinal lymphadenopathy. Evaluation 

for hilar adenopathy is limited due to the lack of circulating 

intravenous contrast material. 



No aortic atherosclerotic calcification.



There is a small hiatal hernia. 



PLEURA:

No pleural fluid. No pneumothorax.



BONES:

Minor multilevel degenerative spondylosis of the thoracic spine. 

There are no acute compression fractures nor retropulsed fragments.. 

Intradiscal calcifications seen at the T12-L1 disc space level. Small 

of sclerotic focus within the T11 segment probably represents tiny 

bone island. 



UPPER ABDOMEN:

There are several of varying sized low-attenuation foci within the 

right lobe of the liver the largest in the posterior superior aspect 

right lobe bordering the diaphragmatic surface measuring 

approximately 4.5 x 4.4 x 4.4 cm with Hounsfield units in the single 

digits consistent with cyst. Another focus anterior aspect anterior 

superior aspect left lobe measuring 11.2 mm exhibits Hounsfield units 

the in the upper 30s on nonspecific. Another small elliptical shaped 

focus. There are multiple additional small foci seen on many of which 

are too small to characterize though probably represent cysts as well 

however follow-up triple phase CT scan of the liver recommended to 

confirm and exclude other pathology if indicated. 



Small nonobstructing calculus upper pole right kidney 



OTHER FINDINGS:

 None.



IMPRESSION:

Chronic manifestations of COPD felt be present.. Mild 

curvilinear/linear type atelectasis/scarring changes seen in the 

right lower lobe as well as right middle lobe bordering the minor 

fissure. 



Multiple low-attenuation foci scattered throughout the hepatic 

parenchyma several which are consistent with hepatic cysts however 

some of the additional foci too small to characterize. Follow-up 

triple phase CT scan of the liver could be performed to confirm and 

exclude other pathology if clinically indicated 



See above discussion for additional details and findings.

## 2018-11-10 RX ADMIN — ACETYLCYSTEINE SCH: 200 INHALANT RESPIRATORY (INHALATION) at 04:35

## 2018-11-10 RX ADMIN — INSULIN ASPART SCH UNITS: 100 INJECTION, SOLUTION INTRAVENOUS; SUBCUTANEOUS at 13:18

## 2018-11-10 RX ADMIN — INSULIN GLARGINE SCH UNITS: 100 INJECTION, SOLUTION SUBCUTANEOUS at 21:59

## 2018-11-10 RX ADMIN — INSULIN ASPART SCH UNITS: 100 INJECTION, SOLUTION INTRAVENOUS; SUBCUTANEOUS at 17:55

## 2018-11-10 RX ADMIN — INSULIN ASPART SCH UNITS: 100 INJECTION, SOLUTION INTRAVENOUS; SUBCUTANEOUS at 09:42

## 2018-11-10 RX ADMIN — IPRATROPIUM BROMIDE AND ALBUTEROL SULFATE SCH ML: .5; 3 SOLUTION RESPIRATORY (INHALATION) at 11:50

## 2018-11-10 RX ADMIN — IPRATROPIUM BROMIDE AND ALBUTEROL SULFATE SCH ML: .5; 3 SOLUTION RESPIRATORY (INHALATION) at 20:38

## 2018-11-10 RX ADMIN — PANTOPRAZOLE SODIUM SCH MG: 40 TABLET, DELAYED RELEASE ORAL at 09:42

## 2018-11-10 RX ADMIN — METHYLPREDNISOLONE SODIUM SUCCINATE SCH MG: 40 INJECTION, POWDER, FOR SOLUTION INTRAMUSCULAR; INTRAVENOUS at 06:53

## 2018-11-10 RX ADMIN — METHYLPREDNISOLONE SODIUM SUCCINATE SCH MG: 40 INJECTION, POWDER, FOR SOLUTION INTRAMUSCULAR; INTRAVENOUS at 23:34

## 2018-11-10 RX ADMIN — ACETYLCYSTEINE SCH: 200 INHALANT RESPIRATORY (INHALATION) at 20:39

## 2018-11-10 RX ADMIN — ACETYLCYSTEINE SCH: 200 INHALANT RESPIRATORY (INHALATION) at 08:20

## 2018-11-10 RX ADMIN — ENOXAPARIN SODIUM SCH MG: 40 INJECTION SUBCUTANEOUS at 09:43

## 2018-11-10 RX ADMIN — IPRATROPIUM BROMIDE AND ALBUTEROL SULFATE SCH: .5; 3 SOLUTION RESPIRATORY (INHALATION) at 04:35

## 2018-11-10 RX ADMIN — INSULIN ASPART SCH: 100 INJECTION, SOLUTION INTRAVENOUS; SUBCUTANEOUS at 22:35

## 2018-11-10 RX ADMIN — METHYLPREDNISOLONE SODIUM SUCCINATE SCH MG: 40 INJECTION, POWDER, FOR SOLUTION INTRAMUSCULAR; INTRAVENOUS at 14:22

## 2018-11-10 RX ADMIN — ACETYLCYSTEINE SCH ML: 200 INHALANT RESPIRATORY (INHALATION) at 16:16

## 2018-11-10 RX ADMIN — IPRATROPIUM BROMIDE AND ALBUTEROL SULFATE SCH ML: .5; 3 SOLUTION RESPIRATORY (INHALATION) at 08:19

## 2018-11-10 RX ADMIN — ACETYLCYSTEINE SCH: 200 INHALANT RESPIRATORY (INHALATION) at 01:45

## 2018-11-10 RX ADMIN — IPRATROPIUM BROMIDE AND ALBUTEROL SULFATE SCH: .5; 3 SOLUTION RESPIRATORY (INHALATION) at 00:45

## 2018-11-10 RX ADMIN — IPRATROPIUM BROMIDE AND ALBUTEROL SULFATE SCH ML: .5; 3 SOLUTION RESPIRATORY (INHALATION) at 16:16

## 2018-11-10 RX ADMIN — ACETYLCYSTEINE SCH ML: 200 INHALANT RESPIRATORY (INHALATION) at 11:50

## 2018-11-10 RX ADMIN — PROMETHAZINE HYDROCHLORIDE AND CODEINE PHOSPHATE PRN ML: 6.25; 1 SOLUTION ORAL at 12:17

## 2018-11-11 RX ADMIN — METHYLPREDNISOLONE SODIUM SUCCINATE SCH MG: 40 INJECTION, POWDER, FOR SOLUTION INTRAMUSCULAR; INTRAVENOUS at 14:29

## 2018-11-11 RX ADMIN — IPRATROPIUM BROMIDE AND ALBUTEROL SULFATE SCH ML: .5; 3 SOLUTION RESPIRATORY (INHALATION) at 15:55

## 2018-11-11 RX ADMIN — INSULIN GLARGINE SCH UNITS: 100 INJECTION, SOLUTION SUBCUTANEOUS at 22:26

## 2018-11-11 RX ADMIN — INSULIN ASPART SCH UNITS: 100 INJECTION, SOLUTION INTRAVENOUS; SUBCUTANEOUS at 08:52

## 2018-11-11 RX ADMIN — IPRATROPIUM BROMIDE AND ALBUTEROL SULFATE SCH ML: .5; 3 SOLUTION RESPIRATORY (INHALATION) at 23:18

## 2018-11-11 RX ADMIN — ACETYLCYSTEINE SCH ML: 200 INHALANT RESPIRATORY (INHALATION) at 19:50

## 2018-11-11 RX ADMIN — ACETYLCYSTEINE SCH: 200 INHALANT RESPIRATORY (INHALATION) at 00:20

## 2018-11-11 RX ADMIN — METHYLPREDNISOLONE SODIUM SUCCINATE SCH MG: 40 INJECTION, POWDER, FOR SOLUTION INTRAMUSCULAR; INTRAVENOUS at 22:25

## 2018-11-11 RX ADMIN — PANTOPRAZOLE SODIUM SCH MG: 40 TABLET, DELAYED RELEASE ORAL at 09:01

## 2018-11-11 RX ADMIN — ACETYLCYSTEINE SCH: 200 INHALANT RESPIRATORY (INHALATION) at 08:14

## 2018-11-11 RX ADMIN — METHYLPREDNISOLONE SODIUM SUCCINATE SCH MG: 40 INJECTION, POWDER, FOR SOLUTION INTRAMUSCULAR; INTRAVENOUS at 06:20

## 2018-11-11 RX ADMIN — ENOXAPARIN SODIUM SCH MG: 40 INJECTION SUBCUTANEOUS at 09:00

## 2018-11-11 RX ADMIN — ACETYLCYSTEINE SCH ML: 200 INHALANT RESPIRATORY (INHALATION) at 15:56

## 2018-11-11 RX ADMIN — INSULIN ASPART SCH UNITS: 100 INJECTION, SOLUTION INTRAVENOUS; SUBCUTANEOUS at 12:46

## 2018-11-11 RX ADMIN — IPRATROPIUM BROMIDE AND ALBUTEROL SULFATE SCH ML: .5; 3 SOLUTION RESPIRATORY (INHALATION) at 11:30

## 2018-11-11 RX ADMIN — ACETYLCYSTEINE SCH ML: 200 INHALANT RESPIRATORY (INHALATION) at 23:18

## 2018-11-11 RX ADMIN — ACETYLCYSTEINE SCH ML: 200 INHALANT RESPIRATORY (INHALATION) at 11:30

## 2018-11-11 RX ADMIN — INSULIN ASPART SCH: 100 INJECTION, SOLUTION INTRAVENOUS; SUBCUTANEOUS at 22:27

## 2018-11-11 RX ADMIN — INSULIN ASPART SCH UNITS: 100 INJECTION, SOLUTION INTRAVENOUS; SUBCUTANEOUS at 17:30

## 2018-11-11 RX ADMIN — IPRATROPIUM BROMIDE AND ALBUTEROL SULFATE SCH ML: .5; 3 SOLUTION RESPIRATORY (INHALATION) at 00:20

## 2018-11-11 RX ADMIN — IPRATROPIUM BROMIDE AND ALBUTEROL SULFATE SCH ML: .5; 3 SOLUTION RESPIRATORY (INHALATION) at 19:50

## 2018-11-11 RX ADMIN — IPRATROPIUM BROMIDE AND ALBUTEROL SULFATE SCH ML: .5; 3 SOLUTION RESPIRATORY (INHALATION) at 08:14

## 2018-11-12 VITALS — RESPIRATION RATE: 18 BRPM | TEMPERATURE: 98.2 F | OXYGEN SATURATION: 97 % | HEART RATE: 85 BPM

## 2018-11-12 VITALS — SYSTOLIC BLOOD PRESSURE: 137 MMHG | DIASTOLIC BLOOD PRESSURE: 72 MMHG

## 2018-11-12 RX ADMIN — ACETYLCYSTEINE SCH: 200 INHALANT RESPIRATORY (INHALATION) at 04:17

## 2018-11-12 RX ADMIN — INSULIN ASPART SCH UNITS: 100 INJECTION, SOLUTION INTRAVENOUS; SUBCUTANEOUS at 08:28

## 2018-11-12 RX ADMIN — PANTOPRAZOLE SODIUM SCH MG: 40 TABLET, DELAYED RELEASE ORAL at 09:32

## 2018-11-12 RX ADMIN — ENOXAPARIN SODIUM SCH MG: 40 INJECTION SUBCUTANEOUS at 09:32

## 2018-11-12 RX ADMIN — INSULIN ASPART SCH UNITS: 100 INJECTION, SOLUTION INTRAVENOUS; SUBCUTANEOUS at 12:51

## 2018-11-12 RX ADMIN — METHYLPREDNISOLONE SODIUM SUCCINATE SCH MG: 40 INJECTION, POWDER, FOR SOLUTION INTRAMUSCULAR; INTRAVENOUS at 06:16

## 2018-11-12 NOTE — CP.PCM.PN
Subjective





- Date & Time of Evaluation


Date of Evaluation: 11/12/18


Time of Evaluation: 08:11





- Subjective


Subjective: 


PGY2- Progress note for Dr. Anders





Patient was seen and examined at bedside in no acute distress. Patient states he

is feeling better, no longer short of breath or wheezing. He denies having 

cough, fevers, headaches, nausea, vomiting, abdominal pain, chest pain, 

palpitations, dysuria, constipation, and diarrhea.





Objective





- Vital Signs/Intake and Output


Vital Signs (last 24 hours): 


                                        











Temp Pulse Resp BP Pulse Ox


 


 98.2 F   85   18   135/78   97 


 


 11/12/18 07:00  11/12/18 07:00  11/12/18 07:00  11/12/18 07:00  11/12/18 07:00








Intake and Output: 


                                        











 11/12/18 11/12/18





 06:59 18:59


 


Intake Total 980 


 


Balance 980 














- Medications


Medications: 


                               Current Medications





Acetylcysteine (Acetylcysteine 20%)  4 ml INH RQ4 [facility]


   Last Admin: 11/12/18 04:17 Dose:  Not Given


Aspirin (Aspirin Chewable)  81 mg PO DAILY [facility]


   Last Admin: 11/11/18 09:01 Dose:  81 mg


Enalapril Maleate (Vasotec)  10 mg PO BID [facility]


   Last Admin: 11/11/18 17:49 Dose:  10 mg


Enoxaparin Sodium (Lovenox)  40 mg SC DAILY [facility]


   Last Admin: 11/11/18 09:00 Dose:  40 mg


Home Med (Patient's Own Drops)  1 drop OS QID [facility]


   Last Admin: 11/11/18 22:33 Dose:  1 drop


Home Med (Patient's Own Drops)  1 drop OD QID [facility]


   Last Admin: 11/11/18 22:32 Dose:  1 drop


Home Med (Patient's Own Drops)  1 drop OS HS [facility]


   Last Admin: 11/11/18 22:31 Dose:  1 drop


Insulin Aspart (Novolog)  0 unit SC [facility]; Protocol


   Last Admin: 11/11/18 22:27 Dose:  Not Given


Insulin Glargine (Lantus)  10 unit SC [facility]


   Last Admin: 11/11/18 22:26 Dose:  10 units


Metformin HCl (Glucophage)  500 mg PO BID [facility]


   Last Admin: 11/11/18 17:49 Dose:  500 mg


Methylprednisolone (Solu-Medrol)  40 mg IVP Q8H [facility]


   Last Admin: 11/12/18 06:16 Dose:  40 mg


Pantoprazole Sodium (Protonix Ec Tab)  40 mg PO DAILY [facility]


   Last Admin: 11/11/18 09:01 Dose:  40 mg


Promethazine HCl/Codeine (Phenergan/Codeine Oral Syrup)  5 ml PO Q6H PRN


   PRN Reason: Cough


   Last Admin: 11/10/18 12:17 Dose:  5 ml


Rosuvastatin Calcium (Crestor)  5 mg PO HS [facility]


   Last Admin: 11/11/18 22:36 Dose:  5 mg


Tamsulosin HCl (Flomax)  0.4 mg PO DAILY [facility]


   Last Admin: 11/11/18 09:03 Dose:  0.4 mg











- Labs


Labs: 


                                        





                                 11/07/18 10:57 





                                 11/07/18 10:57 











- Additional Findings


Additional findings: 





- Constitutional


Appears: No Acute Distress





- Head Exam


Head Exam: NORMAL INSPECTION





- Eye Exam


Eye Exam: EOMI, Normal appearance





- ENT Exam


ENT Exam: Mucous Membranes Moist





- Respiratory Exam


Respiratory Exam: Clear to auscultation, NORMAL BREATHING PATTERN.  absent: 

Wheezes, Rales, Rhonchi, Respiratory Distress





- Cardiovascular Exam


Cardiovascular Exam: REGULAR RHYTHM, +S1, +S2





- GI/Abdominal Exam


GI & Abdominal Exam: Soft, Normal Bowel Sounds.  absent: Distended, Firm, 

Tenderness





- Extremities Exam


Extremities Exam: Normal Inspection.  absent: Calf Tenderness, Pedal Edema, 

Tenderness





- Neurological Exam


Neurological Exam: Alert, Awake, Oriented x3





- Psychiatric Exam


Psychiatric exam: Normal Affect, Normal Mood





- Skin


Skin Exam: Dry, Normal Color, Warm





Assessment and Plan





- Assessment and Plan (Free Text)


Plan: 


COPD exacerbation


- Duonebs RQ4, mucomyst Q4h


- Solumedrol 40 IV q8h


- O2 via nc prn 


- Discontinued Avelox 400 PO daily (active from 11/6/18 - 11/11/18)


- CXR: negative for acute pathology


- Chest CT: chronic changes consistent with COPD





DM


- Accuchecks


- Hypoglycemia protocol


- ISS


- Lantus 10units SC HS


- Metformin 500mg PO BID





HTN


- Continue Enalapril 10 mg PO BID





HLD


- Continue Crestor 5mg PO HS





Hx of small cell lung cancer


- s/p chemo and radiation; tx with  16 and cisplatin





BPH


- Flomax 0.4mg PO daily





Prophylaxis


- DVT: Lovenox 40mg SC, SCDs


- Protonix 40mg PO daily


- Consistent carb diet





All management/orders per Dr. Anders.











Patient is stable for discharge to home per Dr. Anders. Patient must continue 

home medications. 


Patient must take new medication prescribed as directed: Medrol Dose Pack (take 

as directed).


Patient must follow up with PMD, Dr. Anders, within one week of discharge. 

Patient will follow up with Dr. Anders regarding home oxygen.


If symptoms worsen or reoccur, patient should return to the nearest ED.

## 2021-06-04 ENCOUNTER — PREPPED CHART (OUTPATIENT)
Dept: URBAN - METROPOLITAN AREA CLINIC 110 | Facility: CLINIC | Age: 76
End: 2021-06-04

## 2021-06-04 PROBLEM — H04.123 DRY EYE SYNDROME: Noted: 2021-06-04

## 2021-06-04 PROBLEM — H26.492 POSTERIOR CAPSULAR OPACITY: Noted: 2021-06-04

## 2021-06-04 PROBLEM — H43.393 VITREOUS FLOATERS: Noted: 2021-06-04

## 2021-06-04 PROBLEM — H43.392 VITREOUS SYNERESIS: Noted: 2021-06-04

## 2021-06-04 PROBLEM — H43.393 VITREOUS SYNERESIS: Noted: 2021-06-04

## 2021-06-04 PROBLEM — E11.9 DIABETES, TYPE II, NO OCULAR COMPLICATIONS: Noted: 2021-06-04

## 2022-03-11 ENCOUNTER — FOLLOW UP (OUTPATIENT)
Dept: URBAN - METROPOLITAN AREA CLINIC 110 | Facility: CLINIC | Age: 77
End: 2022-03-11

## 2022-03-11 DIAGNOSIS — H52.4: ICD-10-CM

## 2022-03-11 DIAGNOSIS — H04.123: ICD-10-CM

## 2022-03-11 DIAGNOSIS — E11.9: ICD-10-CM

## 2022-03-11 DIAGNOSIS — H52.02: ICD-10-CM

## 2022-03-11 DIAGNOSIS — H52.11: ICD-10-CM

## 2022-03-11 DIAGNOSIS — H43.393: ICD-10-CM

## 2022-03-11 DIAGNOSIS — H52.223: ICD-10-CM

## 2022-03-11 DIAGNOSIS — Z96.1: ICD-10-CM

## 2022-03-11 PROCEDURE — 92202 OPSCPY EXTND ON/MAC DRAW: CPT

## 2022-03-11 PROCEDURE — 92015 DETERMINE REFRACTIVE STATE: CPT

## 2022-03-11 PROCEDURE — 92014 COMPRE OPH EXAM EST PT 1/>: CPT

## 2022-03-11 ASSESSMENT — VISUAL ACUITY
OU_CC: J2
OD_CC: 20/20-1
OS_CC: 20/25

## 2022-03-11 ASSESSMENT — TONOMETRY
OD_IOP_MMHG: 12
OS_IOP_MMHG: 8

## 2023-03-14 ENCOUNTER — ESTABLISHED COMPREHENSIVE EXAM (OUTPATIENT)
Dept: URBAN - METROPOLITAN AREA CLINIC 110 | Facility: CLINIC | Age: 78
End: 2023-03-14

## 2023-03-14 DIAGNOSIS — H43.392: ICD-10-CM

## 2023-03-14 DIAGNOSIS — Z96.1: ICD-10-CM

## 2023-03-14 DIAGNOSIS — H04.123: ICD-10-CM

## 2023-03-14 DIAGNOSIS — H52.223: ICD-10-CM

## 2023-03-14 DIAGNOSIS — H52.4: ICD-10-CM

## 2023-03-14 DIAGNOSIS — H52.02: ICD-10-CM

## 2023-03-14 DIAGNOSIS — E11.9: ICD-10-CM

## 2023-03-14 DIAGNOSIS — H52.11: ICD-10-CM

## 2023-03-14 DIAGNOSIS — H35.372: ICD-10-CM

## 2023-03-14 DIAGNOSIS — H43.811: ICD-10-CM

## 2023-03-14 PROCEDURE — 92015 DETERMINE REFRACTIVE STATE: CPT

## 2023-03-14 PROCEDURE — 92014 COMPRE OPH EXAM EST PT 1/>: CPT

## 2023-03-14 PROCEDURE — 92134 CPTRZ OPH DX IMG PST SGM RTA: CPT

## 2023-03-14 PROCEDURE — 92202 OPSCPY EXTND ON/MAC DRAW: CPT

## 2023-03-14 ASSESSMENT — VISUAL ACUITY
OU_CC: J1+
OS_CC: 20/25
OD_CC: 20/20

## 2023-03-14 ASSESSMENT — TONOMETRY
OD_IOP_MMHG: 13
OS_IOP_MMHG: 12